# Patient Record
Sex: FEMALE | Race: WHITE | Employment: OTHER | URBAN - NONMETROPOLITAN AREA
[De-identification: names, ages, dates, MRNs, and addresses within clinical notes are randomized per-mention and may not be internally consistent; named-entity substitution may affect disease eponyms.]

---

## 2023-05-17 ENCOUNTER — APPOINTMENT (OUTPATIENT)
Dept: CT IMAGING | Age: 73
End: 2023-05-17
Payer: MEDICARE

## 2023-05-17 ENCOUNTER — HOSPITAL ENCOUNTER (EMERGENCY)
Age: 73
Discharge: HOME | End: 2023-05-17
Attending: EMERGENCY MEDICINE
Payer: MEDICARE

## 2023-05-17 VITALS
SYSTOLIC BLOOD PRESSURE: 155 MMHG | DIASTOLIC BLOOD PRESSURE: 65 MMHG | RESPIRATION RATE: 14 BRPM | HEART RATE: 73 BPM | OXYGEN SATURATION: 96 % | TEMPERATURE: 97.8 F
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
## 2023-05-17 LAB
ALBUMIN SERPL BCG-MCNC: 3.8 G/DL (ref 3.5–5.1)
ALP SERPL-CCNC: 98 U/L (ref 38–126)
ALT SERPL W/O P-5'-P-CCNC: 10 U/L (ref 11–66)
ANION GAP SERPL CALC-SCNC: 12 MEQ/L (ref 8–16)
AST SERPL-CCNC: 15 U/L (ref 5–40)
BACTERIA: NORMAL
BASOPHILS ABSOLUTE: 0.1 THOU/MM3 (ref 0–0.1)
BASOPHILS NFR BLD AUTO: 2 %
BILIRUB SERPL-MCNC: 0.4 MG/DL (ref 0.3–1.2)
BILIRUB UR QL STRIP: NEGATIVE
BUN SERPL-MCNC: 8 MG/DL (ref 7–22)
CALCIUM SERPL-MCNC: 9 MG/DL (ref 8.5–10.5)
CASTS #/AREA URNS LPF: NORMAL /LPF
CASTS #/AREA URNS LPF: NORMAL /LPF
CHARACTER UR: CLEAR
CHARCOAL URNS QL MICRO: NORMAL
CHLORIDE SERPL-SCNC: 105 MEQ/L (ref 98–111)
CO2 SERPL-SCNC: 27 MEQ/L (ref 23–33)
COLOR UR: YELLOW
CREAT SERPL-MCNC: 0.9 MG/DL (ref 0.4–1.2)
CRYSTALS URNS QL MICRO: NORMAL
DEPRECATED RDW RBC AUTO: 49.7 FL (ref 35–45)
EOSINOPHIL NFR BLD AUTO: 7 %
EOSINOPHILS ABSOLUTE: 0.4 THOU/MM3 (ref 0–0.4)
EPITHELIAL CELLS, UA: NORMAL /HPF
ERYTHROCYTE [DISTWIDTH] IN BLOOD BY AUTOMATED COUNT: 13.7 % (ref 11.5–14.5)
GFR SERPL CREATININE-BSD FRML MDRD: > 60 ML/MIN/1.73M2
GLUCOSE SERPL-MCNC: 111 MG/DL (ref 70–108)
GLUCOSE UR QL STRIP.AUTO: NEGATIVE MG/DL
HCT VFR BLD AUTO: 43.9 % (ref 37–47)
HGB BLD-MCNC: 13.7 GM/DL (ref 12–16)
HGB UR QL STRIP.AUTO: NEGATIVE
IMM GRANULOCYTES # BLD AUTO: 0.02 THOU/MM3 (ref 0–0.07)
IMM GRANULOCYTES NFR BLD AUTO: 0.4 %
KETONES UR QL STRIP.AUTO: NEGATIVE
LEUKOCYTE ESTERASE UR QL STRIP.AUTO: NEGATIVE
LYMPHOCYTES ABSOLUTE: 1 THOU/MM3 (ref 1–4.8)
LYMPHOCYTES NFR BLD AUTO: 18.6 %
MAGNESIUM SERPL-MCNC: 2 MG/DL (ref 1.6–2.4)
MCH RBC QN AUTO: 30.8 PG (ref 26–33)
MCHC RBC AUTO-ENTMCNC: 31.2 GM/DL (ref 32.2–35.5)
MCV RBC AUTO: 98.7 FL (ref 81–99)
MONOCYTES ABSOLUTE: 0.6 THOU/MM3 (ref 0.4–1.3)
MONOCYTES NFR BLD AUTO: 10.3 %
NEUTROPHILS NFR BLD AUTO: 61.7 %
NITRITE UR QL STRIP.AUTO: NEGATIVE
NRBC BLD AUTO-RTO: 0 /100 WBC
OSMOLALITY SERPL CALC.SUM OF ELEC: 285.9 MOSMOL/KG (ref 275–300)
PH UR STRIP.AUTO: 7.5 [PH] (ref 5–9)
PLATELET # BLD AUTO: 976 THOU/MM3 (ref 130–400)
PMV BLD AUTO: 10.1 FL (ref 9.4–12.4)
POTASSIUM SERPL-SCNC: 4.1 MEQ/L (ref 3.5–5.2)
PROT SERPL-MCNC: 6.6 G/DL (ref 6.1–8)
PROT UR STRIP.AUTO-MCNC: NEGATIVE MG/DL
RBC # BLD AUTO: 4.45 MILL/MM3 (ref 4.2–5.4)
RBC #/AREA URNS HPF: NORMAL /HPF
RENAL EPI CELLS #/AREA URNS HPF: NORMAL /[HPF]
SEGMENTED NEUTROPHILS ABSOLUTE COUNT: 3.3 THOU/MM3 (ref 1.8–7.7)
SODIUM SERPL-SCNC: 144 MEQ/L (ref 135–145)
SPECIFIC GRAVITY UA: 1.01 (ref 1–1.03)
UROBILINOGEN, URINE: 0.2 EU/DL (ref 0–1)
WBC # BLD AUTO: 5.4 THOU/MM3 (ref 4.8–10.8)
WBC #/AREA URNS HPF: NORMAL /HPF
YEAST LIKE FUNGI URNS QL MICRO: NORMAL

## 2023-05-17 PROCEDURE — 83735 ASSAY OF MAGNESIUM: CPT

## 2023-05-17 PROCEDURE — 72125 CT NECK SPINE W/O DYE: CPT

## 2023-05-17 PROCEDURE — 87086 URINE CULTURE/COLONY COUNT: CPT

## 2023-05-17 PROCEDURE — 73700 CT LOWER EXTREMITY W/O DYE: CPT

## 2023-05-17 PROCEDURE — 36415 COLL VENOUS BLD VENIPUNCTURE: CPT

## 2023-05-17 PROCEDURE — 70486 CT MAXILLOFACIAL W/O DYE: CPT

## 2023-05-17 PROCEDURE — 81001 URINALYSIS AUTO W/SCOPE: CPT

## 2023-05-17 PROCEDURE — 85025 COMPLETE CBC W/AUTO DIFF WBC: CPT

## 2023-05-17 PROCEDURE — 93010 ELECTROCARDIOGRAM REPORT: CPT | Performed by: INTERNAL MEDICINE

## 2023-05-17 PROCEDURE — 99284 EMERGENCY DEPT VISIT MOD MDM: CPT

## 2023-05-17 PROCEDURE — 80053 COMPREHEN METABOLIC PANEL: CPT

## 2023-05-17 PROCEDURE — 93005 ELECTROCARDIOGRAM TRACING: CPT | Performed by: EMERGENCY MEDICINE

## 2023-05-17 PROCEDURE — 70450 CT HEAD/BRAIN W/O DYE: CPT

## 2023-05-17 ASSESSMENT — PAIN - FUNCTIONAL ASSESSMENT: PAIN_FUNCTIONAL_ASSESSMENT: NONE - DENIES PAIN

## 2023-05-17 NOTE — ED PROVIDER NOTES
I performed a history and physical examination of the patient and discussed management with the resident. I reviewed the residents note and agree with the documented findings and plan of care. Any areas of disagreement are noted on the chart. I was personally present for the key portions of any procedures. I have documented in the chart those procedures where I was not present during the key portions. I have reviewed the emergency nurses triage note. I agree with the chief complaint, past medical history, past surgical history, allergies, medications, social and family history as documented unless otherwise noted below. Documentation of the HPI, Physical Exam and Medical Decision Making performed by medical students or scribes is based on my personal performance of the HPI, PE and MDM. For Phys Assistant/ Nurse Practitioner cases/documentation I have personally evaluated this patient and have completed at least one if not all key elements of the E/M (history, physical exam, and MDM). My findings are as noted below. In other words, I personally saw and examined the patient I have reviewed and agreed with the resident findings including all diagnostic interpretations and treatment plans as written. I was present for the key portion of any procedures performed and the inclusive time noted in any critical care statement. Patient presents today via nursing home for hypertension and bradycardia and a fall. Apparently she had a fall on Sunday. She had some hypotension and low heart rate so they decided bring her in. Here today her blood pressure is 157/75 and pulse of 75. Patient has no medical history on file here. She is in a nursing home. She is nonverbal.  Has a significant history of dementia and behavioral disorders. Apparently according to nursing home her heart rate got down to 57. Again her call was on Sunday. Here today she has gotten out of the cot several times and walked around the department.

## 2023-05-17 NOTE — ED NOTES
Patient resting in bed. Respirations easy and unlabored. No distress noted. Call light within reach. Sitter at bedside.      Cat Degroot RN  05/17/23 6283

## 2023-05-17 NOTE — ED TRIAGE NOTES
Presents to ED with c/o fall on Sunday and hypertension today. Patient is at neuro baseline of nonverbal. Patient and obeying commands. Respirations easy and unlabored. Patient has bruising to face.

## 2023-05-17 NOTE — ED NOTES
Patient sitting in wheelchair in room. Sitter at bedside. Will monitor.       Tan Toure RN  05/17/23 9239

## 2023-05-17 NOTE — ED PROVIDER NOTES
[address] [address]          Pt Name: Gio Cerna  MRN: [id number]  [place] 1950  Date of evaluation: 5/17/2023  Treating Resident Physician: Denice Mims DO  Supervising Physician: Dr. Narayan Pringle    History could not be obtained because patient is on afebrile. Gaviota Boswell CHIEF COMPLAINT       Chief Complaint   Patient presents with    Hypertension           HISTORY OF PRESENT ILLNESS    HPI  Gio Cerna is a 75 y.o. female who presents to the emergency department for evaluation of suspected bradycardia in the nursing home. Patient also had a fall where she had injury to the face. Home states that patient fell on Sunday which was 5/14/2023. Could not get history from patient because patient is nonverbal.  Patient is from nursing home. The patient has no other acute complaints at this time. REVIEW OF SYSTEMS   Review of Systems could not be obtained because patient is nonverbal.      PAST MEDICAL AND SURGICAL HISTORY   No past medical history on file. No past surgical history on file. MEDICATIONS   No current facility-administered medications for this encounter. No current outpatient medications on file. SOCIAL HISTORY     Social History     Social History Narrative    Not on file            ALLERGIES     Allergies   Allergen Reactions    Penicillins     Restasis [Cyclosporine]          FAMILY HISTORY   No family history on file. PREVIOUS RECORDS   Previous records reviewed:  yes . PHYSICAL EXAM     ED Triage Vitals   BP Temp Temp Source Pulse Respirations SpO2 Height Weight   05/17/23 1054 05/17/23 1054 05/17/23 1054 05/17/23 1054 05/17/23 1054 05/17/23 1056 -- --   (!) 157/75 97.8 °F (36.6 °C) Oral 75 12 94 %       Initial vital signs and nursing assessment reviewed and  patient's medical record is noted in chart. . There is no height or weight on file to calculate BMI.  Pulsoximetry is  patient's medical record

## 2023-05-18 LAB
BACTERIA UR CULT: ABNORMAL
ORGANISM: ABNORMAL

## 2023-05-19 LAB
EKG ATRIAL RATE: 54 BPM
EKG P AXIS: 4 DEGREES
EKG P-R INTERVAL: 142 MS
EKG Q-T INTERVAL: 442 MS
EKG QRS DURATION: 84 MS
EKG QTC CALCULATION (BAZETT): 419 MS
EKG R AXIS: -50 DEGREES
EKG T AXIS: 62 DEGREES
EKG VENTRICULAR RATE: 54 BPM

## 2023-11-04 ENCOUNTER — APPOINTMENT (OUTPATIENT)
Dept: CT IMAGING | Age: 73
End: 2023-11-04
Payer: MEDICARE

## 2023-11-04 ENCOUNTER — APPOINTMENT (OUTPATIENT)
Dept: GENERAL RADIOLOGY | Age: 73
End: 2023-11-04
Payer: MEDICARE

## 2023-11-04 ENCOUNTER — HOSPITAL ENCOUNTER (EMERGENCY)
Age: 73
Discharge: HOME | End: 2023-11-04
Attending: EMERGENCY MEDICINE
Payer: MEDICARE

## 2023-11-04 VITALS
BODY MASS INDEX: 23.92 KG/M2 | RESPIRATION RATE: 16 BRPM | SYSTOLIC BLOOD PRESSURE: 136 MMHG | TEMPERATURE: 98 F | WEIGHT: 130 LBS | HEIGHT: 62 IN | DIASTOLIC BLOOD PRESSURE: 106 MMHG | HEART RATE: 63 BPM | OXYGEN SATURATION: 96 %
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
## 2023-11-04 DIAGNOSIS — S00.83XA CONTUSION OF JAW, INITIAL ENCOUNTER: Primary | ICD-10-CM

## 2023-11-04 PROBLEM — Z79.02 ANTIPLATELET OR ANTITHROMBOTIC LONG-TERM USE: Status: ACTIVE | Noted: 2023-11-04

## 2023-11-04 PROBLEM — G30.1 SEVERE LATE ONSET ALZHEIMER'S DEMENTIA WITH AGITATION (HCC): Status: ACTIVE | Noted: 2023-11-04

## 2023-11-04 PROBLEM — D75.839 THROMBOCYTOSIS: Status: ACTIVE | Noted: 2023-11-04

## 2023-11-04 PROBLEM — F02.C11 SEVERE LATE ONSET ALZHEIMER'S DEMENTIA WITH AGITATION (HCC): Status: ACTIVE | Noted: 2023-11-04
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
## 2023-11-04 LAB
ALBUMIN SERPL BCG-MCNC: 3.3 G/DL (ref 3.5–5.1)
ALP SERPL-CCNC: 91 U/L (ref 38–126)
ALT SERPL W/O P-5'-P-CCNC: 11 U/L (ref 11–66)
ANION GAP SERPL CALC-SCNC: 6 MEQ/L (ref 8–16)
APTT PPP: 29.2 SECONDS (ref 22–38)
AST SERPL-CCNC: 16 U/L (ref 5–40)
BILIRUB SERPL-MCNC: 0.5 MG/DL (ref 0.3–1.2)
BUN SERPL-MCNC: 10 MG/DL (ref 7–22)
CALCIUM SERPL-MCNC: 8.1 MG/DL (ref 8.5–10.5)
CHLORIDE SERPL-SCNC: 106 MEQ/L (ref 98–111)
CO2 SERPL-SCNC: 27 MEQ/L (ref 23–33)
CREAT SERPL-MCNC: 0.8 MG/DL (ref 0.4–1.2)
GFR SERPL CREATININE-BSD FRML MDRD: > 60 ML/MIN/1.73M2
GLUCOSE SERPL-MCNC: 84 MG/DL (ref 70–108)
INR PPP: 1.03 (ref 0.85–1.13)
OSMOLALITY SERPL CALC.SUM OF ELEC: 275.8 MOSMOL/KG (ref 275–300)
POTASSIUM SERPL-SCNC: 3.7 MEQ/L (ref 3.5–5.2)
PROT SERPL-MCNC: 6.2 G/DL (ref 6.1–8)
REASON FOR REJECTION: NORMAL
REJECTED TEST: NORMAL
SCAN OF BLOOD SMEAR: NORMAL
SODIUM SERPL-SCNC: 139 MEQ/L (ref 135–145)
TROPONIN, HIGH SENSITIVITY: 13 NG/L (ref 0–12)

## 2023-11-04 PROCEDURE — 72125 CT NECK SPINE W/O DYE: CPT

## 2023-11-04 PROCEDURE — 99284 EMERGENCY DEPT VISIT MOD MDM: CPT | Performed by: SURGERY

## 2023-11-04 PROCEDURE — 6820000002 HC L2 INJURY CALL ACTIVATION: Performed by: SURGERY

## 2023-11-04 PROCEDURE — 2580000003 HC RX 258: Performed by: STUDENT IN AN ORGANIZED HEALTH CARE EDUCATION/TRAINING PROGRAM

## 2023-11-04 PROCEDURE — 70450 CT HEAD/BRAIN W/O DYE: CPT

## 2023-11-04 PROCEDURE — 93005 ELECTROCARDIOGRAM TRACING: CPT | Performed by: STUDENT IN AN ORGANIZED HEALTH CARE EDUCATION/TRAINING PROGRAM

## 2023-11-04 PROCEDURE — 70486 CT MAXILLOFACIAL W/O DYE: CPT

## 2023-11-04 PROCEDURE — 84484 ASSAY OF TROPONIN QUANT: CPT

## 2023-11-04 PROCEDURE — 85610 PROTHROMBIN TIME: CPT

## 2023-11-04 PROCEDURE — 99285 EMERGENCY DEPT VISIT HI MDM: CPT

## 2023-11-04 PROCEDURE — 85025 COMPLETE CBC W/AUTO DIFF WBC: CPT

## 2023-11-04 PROCEDURE — 72170 X-RAY EXAM OF PELVIS: CPT

## 2023-11-04 PROCEDURE — 36415 COLL VENOUS BLD VENIPUNCTURE: CPT

## 2023-11-04 PROCEDURE — 71045 X-RAY EXAM CHEST 1 VIEW: CPT

## 2023-11-04 PROCEDURE — 85730 THROMBOPLASTIN TIME PARTIAL: CPT

## 2023-11-04 PROCEDURE — 80053 COMPREHEN METABOLIC PANEL: CPT

## 2023-11-04 RX ORDER — 0.9 % SODIUM CHLORIDE 0.9 %
1000 INTRAVENOUS SOLUTION INTRAVENOUS ONCE
Status: COMPLETED | OUTPATIENT
Start: 2023-11-04 | End: 2023-11-04

## 2023-11-04 RX ADMIN — SODIUM CHLORIDE 1000 ML: 9 INJECTION, SOLUTION INTRAVENOUS at 19:42

## 2023-11-04 ASSESSMENT — PAIN - FUNCTIONAL ASSESSMENT: PAIN_FUNCTIONAL_ASSESSMENT: NONE - DENIES PAIN

## 2023-11-04 NOTE — ED NOTES
Pt presents to the ED through EMS with c/c bruising to chin and abrasion. EMS reports that pt has had frequent falls at the nursing home. Pt had unwitnessed fall today, possibly new abrasion to the chin. Pt on daily regimen of Clopidogrel. Level 2 trauma activated d/t nature of fall and uncertainty of possible new bruising.       Raj Farias RN  11/04/23 4072

## 2023-11-04 NOTE — ED NOTES
C-collar placed on pt at this time. Warm blankets placed on patient.       Jacquelin Mata RN  11/04/23 9221

## 2023-11-05 LAB
AUTO DIFF PNL BLD: ABNORMAL
BASOPHILS ABSOLUTE: 0.1 THOU/MM3 (ref 0–0.1)
BASOPHILS NFR BLD AUTO: 1.9 %
DEPRECATED RDW RBC AUTO: 51.6 FL (ref 35–45)
EKG ATRIAL RATE: 61 BPM
EKG P AXIS: 27 DEGREES
EKG P-R INTERVAL: 194 MS
EKG Q-T INTERVAL: 446 MS
EKG QRS DURATION: 92 MS
EKG QTC CALCULATION (BAZETT): 448 MS
EKG R AXIS: -48 DEGREES
EKG T AXIS: 56 DEGREES
EKG VENTRICULAR RATE: 61 BPM
EOSINOPHIL NFR BLD AUTO: 7.8 %
EOSINOPHILS ABSOLUTE: 0.5 THOU/MM3 (ref 0–0.4)
ERYTHROCYTE [DISTWIDTH] IN BLOOD BY AUTOMATED COUNT: 14.1 % (ref 11.5–14.5)
HCT VFR BLD AUTO: 43.5 % (ref 37–47)
HGB BLD-MCNC: 13.9 GM/DL (ref 12–16)
IMM GRANULOCYTES # BLD AUTO: 0.04 THOU/MM3 (ref 0–0.07)
IMM GRANULOCYTES NFR BLD AUTO: 0.6 %
LYMPHOCYTES ABSOLUTE: 1.1 THOU/MM3 (ref 1–4.8)
LYMPHOCYTES NFR BLD AUTO: 17.3 %
MCH RBC QN AUTO: 31.7 PG (ref 26–33)
MCHC RBC AUTO-ENTMCNC: 32 GM/DL (ref 32.2–35.5)
MCV RBC AUTO: 99.3 FL (ref 81–99)
MONOCYTES ABSOLUTE: 0.9 THOU/MM3 (ref 0.4–1.3)
MONOCYTES NFR BLD AUTO: 13.7 %
NEUTROPHILS NFR BLD AUTO: 58.7 %
NRBC BLD AUTO-RTO: 0 /100 WBC
PATHOLOGIST REVIEW: ABNORMAL
PLATELET # BLD AUTO: 1004 THOU/MM3 (ref 130–400)
PLATELET BLD QL SMEAR: ABNORMAL
PMV BLD AUTO: 10.5 FL (ref 9.4–12.4)
RBC # BLD AUTO: 4.38 MILL/MM3 (ref 4.2–5.4)
SEGMENTED NEUTROPHILS ABSOLUTE COUNT: 3.8 THOU/MM3 (ref 1.8–7.7)
WBC # BLD AUTO: 6.4 THOU/MM3 (ref 4.8–10.8)

## 2023-11-05 PROCEDURE — 93010 ELECTROCARDIOGRAM REPORT: CPT | Performed by: NUCLEAR MEDICINE

## 2023-11-25 ENCOUNTER — HOSPITAL ENCOUNTER (EMERGENCY)
Age: 73
Discharge: NURSING, REHAB OR LONG-TERM CARE | End: 2023-11-26
Attending: EMERGENCY MEDICINE
Payer: MEDICARE

## 2023-11-25 ENCOUNTER — APPOINTMENT (OUTPATIENT)
Dept: CT IMAGING | Age: 73
End: 2023-11-25
Payer: MEDICARE

## 2023-11-25 DIAGNOSIS — D75.839 THROMBOCYTOSIS: ICD-10-CM
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
## 2023-11-25 LAB
ANION GAP SERPL CALC-SCNC: 9 MEQ/L (ref 8–16)
BASOPHILS ABSOLUTE: 0.1 THOU/MM3 (ref 0–0.1)
BASOPHILS NFR BLD AUTO: 1.3 %
BILIRUB UR QL STRIP.AUTO: NEGATIVE
BUN SERPL-MCNC: 12 MG/DL (ref 7–22)
BURR CELLS BLD QL SMEAR: ABNORMAL
CALCIUM SERPL-MCNC: 8.7 MG/DL (ref 8.5–10.5)
CHARACTER UR: CLEAR
CHLORIDE SERPL-SCNC: 104 MEQ/L (ref 98–111)
CO2 SERPL-SCNC: 25 MEQ/L (ref 23–33)
COLOR: YELLOW
CREAT SERPL-MCNC: 0.9 MG/DL (ref 0.4–1.2)
DEPRECATED RDW RBC AUTO: 51.4 FL (ref 35–45)
EOSINOPHIL NFR BLD AUTO: 9.8 %
EOSINOPHILS ABSOLUTE: 0.7 THOU/MM3 (ref 0–0.4)
ERYTHROCYTE [DISTWIDTH] IN BLOOD BY AUTOMATED COUNT: 14 % (ref 11.5–14.5)
GFR SERPL CREATININE-BSD FRML MDRD: > 60 ML/MIN/1.73M2
GLUCOSE SERPL-MCNC: 121 MG/DL (ref 70–108)
GLUCOSE UR QL STRIP.AUTO: NEGATIVE MG/DL
HCT VFR BLD AUTO: 39.6 % (ref 37–47)
HGB BLD-MCNC: 12.4 GM/DL (ref 12–16)
HGB UR QL STRIP.AUTO: NEGATIVE
IMM GRANULOCYTES # BLD AUTO: 0.03 THOU/MM3 (ref 0–0.07)
IMM GRANULOCYTES NFR BLD AUTO: 0.4 %
INR PPP: 1.08 (ref 0.85–1.13)
KETONES UR QL STRIP.AUTO: NEGATIVE
LYMPHOCYTES ABSOLUTE: 1.2 THOU/MM3 (ref 1–4.8)
LYMPHOCYTES NFR BLD AUTO: 17.7 %
MCH RBC QN AUTO: 31.2 PG (ref 26–33)
MCHC RBC AUTO-ENTMCNC: 31.3 GM/DL (ref 32.2–35.5)
MCV RBC AUTO: 99.5 FL (ref 81–99)
MONOCYTES ABSOLUTE: 0.7 THOU/MM3 (ref 0.4–1.3)
MONOCYTES NFR BLD AUTO: 11 %
NEUTROPHILS NFR BLD AUTO: 59.8 %
NITRITE UR QL STRIP: NEGATIVE
NRBC BLD AUTO-RTO: 0 /100 WBC
OSMOLALITY SERPL CALC.SUM OF ELEC: 276.7 MOSMOL/KG (ref 275–300)
PH UR STRIP.AUTO: 7.5 [PH] (ref 5–9)
PLATELET # BLD AUTO: 1095 THOU/MM3 (ref 130–400)
PLATELET BLD QL SMEAR: ABNORMAL
PMV BLD AUTO: 10.3 FL (ref 9.4–12.4)
POTASSIUM SERPL-SCNC: 4.7 MEQ/L (ref 3.5–5.2)
PROT UR STRIP.AUTO-MCNC: NEGATIVE MG/DL
RBC # BLD AUTO: 3.98 MILL/MM3 (ref 4.2–5.4)
SCAN OF BLOOD SMEAR: NORMAL
SEGMENTED NEUTROPHILS ABSOLUTE COUNT: 4 THOU/MM3 (ref 1.8–7.7)
SODIUM SERPL-SCNC: 138 MEQ/L (ref 135–145)
SP GR UR REFRACT.AUTO: > 1.03 (ref 1–1.03)
UROBILINOGEN, URINE: 1 EU/DL (ref 0–1)
WBC # BLD AUTO: 6.7 THOU/MM3 (ref 4.8–10.8)
WBC #/AREA URNS HPF: NEGATIVE /[HPF]

## 2023-11-25 PROCEDURE — 99285 EMERGENCY DEPT VISIT HI MDM: CPT

## 2023-11-25 PROCEDURE — 36415 COLL VENOUS BLD VENIPUNCTURE: CPT

## 2023-11-25 PROCEDURE — 76376 3D RENDER W/INTRP POSTPROCES: CPT

## 2023-11-25 PROCEDURE — 74177 CT ABD & PELVIS W/CONTRAST: CPT

## 2023-11-25 PROCEDURE — 72125 CT NECK SPINE W/O DYE: CPT

## 2023-11-25 PROCEDURE — 85610 PROTHROMBIN TIME: CPT

## 2023-11-25 PROCEDURE — 80048 BASIC METABOLIC PNL TOTAL CA: CPT

## 2023-11-25 PROCEDURE — 85025 COMPLETE CBC W/AUTO DIFF WBC: CPT

## 2023-11-25 PROCEDURE — 70450 CT HEAD/BRAIN W/O DYE: CPT

## 2023-11-25 PROCEDURE — 81003 URINALYSIS AUTO W/O SCOPE: CPT

## 2023-11-25 PROCEDURE — 6820000002 HC L2 INJURY CALL ACTIVATION: Performed by: SURGERY

## 2023-11-25 PROCEDURE — 93005 ELECTROCARDIOGRAM TRACING: CPT | Performed by: EMERGENCY MEDICINE

## 2023-11-25 PROCEDURE — 71260 CT THORAX DX C+: CPT

## 2023-11-25 PROCEDURE — 6360000004 HC RX CONTRAST MEDICATION: Performed by: EMERGENCY MEDICINE

## 2023-11-25 RX ORDER — TRAMADOL HYDROCHLORIDE 50 MG/1
50 TABLET ORAL EVERY 6 HOURS PRN
COMMUNITY

## 2023-11-25 RX ORDER — HYDRALAZINE HYDROCHLORIDE 25 MG/1
25 TABLET, FILM COATED ORAL 3 TIMES DAILY
COMMUNITY

## 2023-11-25 RX ORDER — PANTOPRAZOLE SODIUM 20 MG/1
20 TABLET, DELAYED RELEASE ORAL DAILY
COMMUNITY

## 2023-11-25 RX ORDER — CLOPIDOGREL BISULFATE 75 MG/1
75 TABLET ORAL DAILY
COMMUNITY

## 2023-11-25 RX ORDER — THIAMINE HYDROCHLORIDE 100 MG/ML
100 INJECTION, SOLUTION INTRAMUSCULAR; INTRAVENOUS DAILY
COMMUNITY

## 2023-11-25 RX ORDER — TRAZODONE HYDROCHLORIDE 50 MG/1
50 TABLET ORAL NIGHTLY
COMMUNITY

## 2023-11-25 RX ORDER — ASPIRIN 81 MG/1
81 TABLET, CHEWABLE ORAL DAILY
COMMUNITY

## 2023-11-25 RX ORDER — MEMANTINE HYDROCHLORIDE 10 MG/1
10 TABLET ORAL NIGHTLY
COMMUNITY

## 2023-11-25 RX ORDER — LISINOPRIL 20 MG/1
20 TABLET ORAL DAILY
COMMUNITY

## 2023-11-25 RX ORDER — MIRTAZAPINE 15 MG/1
7.5 TABLET, FILM COATED ORAL NIGHTLY
COMMUNITY

## 2023-11-25 RX ORDER — THIAMINE MONONITRATE (VIT B1) 100 MG
100 TABLET ORAL DAILY
COMMUNITY

## 2023-11-25 RX ORDER — DONEPEZIL HYDROCHLORIDE 10 MG/1
10 TABLET, FILM COATED ORAL NIGHTLY
COMMUNITY

## 2023-11-25 RX ORDER — 0.9 % SODIUM CHLORIDE 0.9 %
500 INTRAVENOUS SOLUTION INTRAVENOUS ONCE
Status: DISCONTINUED | OUTPATIENT
Start: 2023-11-25 | End: 2023-11-26 | Stop reason: HOSPADM

## 2023-11-25 RX ORDER — FLUOXETINE 10 MG/1
10 CAPSULE ORAL
COMMUNITY

## 2023-11-25 RX ORDER — FERROUS SULFATE 325(65) MG
325 TABLET ORAL
COMMUNITY

## 2023-11-25 RX ORDER — BUSPIRONE HYDROCHLORIDE 10 MG/1
10 TABLET ORAL 3 TIMES DAILY
COMMUNITY

## 2023-11-25 RX ORDER — HALOPERIDOL 2 MG/1
2 TABLET ORAL 2 TIMES DAILY
COMMUNITY

## 2023-11-25 RX ORDER — LANOLIN ALCOHOL/MO/W.PET/CERES
3 CREAM (GRAM) TOPICAL DAILY
COMMUNITY

## 2023-11-25 RX ORDER — DOCUSATE SODIUM 100 MG/1
100 CAPSULE, LIQUID FILLED ORAL NIGHTLY
COMMUNITY

## 2023-11-25 RX ORDER — BUPROPION HYDROCHLORIDE 100 MG/1
100 TABLET ORAL NIGHTLY
COMMUNITY

## 2023-11-25 RX ORDER — ATORVASTATIN CALCIUM 40 MG/1
40 TABLET, FILM COATED ORAL DAILY
COMMUNITY

## 2023-11-25 RX ADMIN — IOPAMIDOL 80 ML: 755 INJECTION, SOLUTION INTRAVENOUS at 17:30

## 2023-11-25 ASSESSMENT — ENCOUNTER SYMPTOMS
SHORTNESS OF BREATH: 0
EYE PAIN: 0
COUGH: 0
TROUBLE SWALLOWING: 0
DIARRHEA: 0
FACIAL SWELLING: 0
WHEEZING: 0
PHOTOPHOBIA: 0
ABDOMINAL DISTENTION: 0
EYE DISCHARGE: 0
SORE THROAT: 0
CHEST TIGHTNESS: 0
CHOKING: 0
BACK PAIN: 0
APNEA: 0
VOMITING: 0
ABDOMINAL PAIN: 0
CONSTIPATION: 0
VOICE CHANGE: 0
NAUSEA: 0
EYE ITCHING: 0
EYE REDNESS: 0
COLOR CHANGE: 0
BLOOD IN STOOL: 0
RHINORRHEA: 0
SINUS PRESSURE: 0
STRIDOR: 0

## 2023-11-25 NOTE — ED NOTES
Dr. Wendy Webber, Dr. Jeannie Castorena and Radha trauma NP at bedside. Bilateral breath sounds noted.  2+ DP and radial pulses per Dr. Kim Juarez, Zoila Calzada RN  11/25/23 2604

## 2023-11-25 NOTE — ED NOTES
Pt and vs reassessed. RR easy and unlabored. Pt attempting to sit up in bed. Pt redirected and posey alarm remains in place.  Pt stable at this time     Jonel Lovelace RN  11/25/23 1480

## 2023-11-25 NOTE — ED NOTES
Pt back from CT in stable condition. VS assessed. RR easy and unlabored. Brief changed and external catheter placed on pt. Posey alarm on.  Pt stable at this time     Tyree Vieyra RN  11/25/23 1851

## 2023-11-25 NOTE — ED TRIAGE NOTES
Pt presents to the ED via EMS from [facility] with c/o fall and head injury. EMS reports pt was walking with assistance when she fell forwards and hit her head on a wheelchair. Pt has abrasion above her right eye. Pt is on blood thinners per nursing home. Pt cries out in pain but does not speak- EMS states pt is at baseline per nursing home. Pt arrives with c-collar in place and IV inserted by EMS. Pt is covid positive since Monday. POCT glucose 121 pta in EMS.

## 2023-11-25 NOTE — ED NOTES
Warm blankets applied.  Per Dr. Navarro Lowers- no obvious bony deformities to any extremities, pelvis is stable, bruising noted to the right breast     Sugey Landrum RN  11/25/23 1635

## 2023-11-25 NOTE — ED NOTES
Pt rolled at this time. Old bruise noted on her right thigh/hip.  No tenderness, step offs or deformities per Dr. Viola Olguin, Karen Ren, KELTON  11/25/23 5737

## 2023-11-26 VITALS
OXYGEN SATURATION: 97 % | RESPIRATION RATE: 17 BRPM | WEIGHT: 130 LBS | TEMPERATURE: 97.4 F | DIASTOLIC BLOOD PRESSURE: 68 MMHG | BODY MASS INDEX: 23.92 KG/M2 | HEART RATE: 68 BPM | SYSTOLIC BLOOD PRESSURE: 142 MMHG | HEIGHT: 62 IN

## 2023-11-26 PROCEDURE — 93010 ELECTROCARDIOGRAM REPORT: CPT | Performed by: NUCLEAR MEDICINE

## 2023-11-26 NOTE — CONSULTS
errors which are inherent in voice recognition technology. **      Final report electronically signed by Dr Mohamud Elliott on 11/25/2023 6:33 PM      CT LUMBAR RECONSTRUCTION WO POST PROCESS   Final Result   1. Age indeterminate mild compression fracture of L2. Marked age indeterminate compression fracture of L3 with greater than 60% vertebrae body height loss. 2. A 3.2 x 2.8 cm infrarenal abdominal aortic aneurysm. 3. A 14.6 x 6.7 x 3.4 cm fluid collection is seen along the right upper thigh region that can relate to a [employer] lesion. 4. Other findings as described above. **This report has been created using voice recognition software. It may contain minor errors which are inherent in voice recognition technology. **      Final report electronically signed by Dr Mohamud Ellitot on 11/25/2023 6:44 PM      CT THORACIC RECONSTRUCTION WO POST PROCESS   Final Result   1. No posttraumatic changes are seen in the chest.   2. No acute fracture of the thoracic spine. **This report has been created using voice recognition software. It may contain minor errors which are inherent in voice recognition technology. **      Final report electronically signed by Dr Mohamud Elliott on 11/25/2023 6:33 PM        Fast Exam: No      25 Minutes spent in patient care collectively between subjective/objective examination, chart review, documentation, clinical reasoning and discussion with attending regarding plan/interval changes.     Electronically signed by ÁLVARO Garcia CNP on 11/25/2023 at 7:18 PM

## 2023-11-26 NOTE — ED NOTES
Pt and vs reassessed. RR easy and unlabored. Pt resting in bed with eyes closed and appears to be sleeping. Posey alarm on.  Pt stable at this time     Donis Goldman RN  11/25/23 8801

## 2023-11-26 NOTE — ED NOTES
Pt and vs reassessed. RR easy and unlabored. Pt resting in bed with eyes closed and appears to be sleeping. Posey alarm on.  Pt stable at this time     Jahaira Alvares RN  11/25/23 2534

## 2023-11-26 NOTE — ED NOTES
Patient gown changed and faheem care performed. New chux placed under patient. Purewick repositioned. Patient boosted up in bed and chair alarm placed on gown. Side rails up x2; call light in reach.       Waldo Zamora  11/25/23 1953

## 2023-11-26 NOTE — ED NOTES
This RN spoke with staff at [address] [place] [address] and updated on pts status and transportation.       Dianna Ramirez RN  11/25/23 3933

## 2023-11-26 NOTE — ED NOTES
Pt and vs reassessed. RR easy and unlabored. Pt resting in bed with eyes closed and appears to be sleeping. Posey alarm in place.  Pt stable at this time     Ramona Leach RN  11/25/23 0880

## 2023-11-26 NOTE — ED NOTES
Pt and vs reassessed. RR easy and unlabored. Pt resting in bed alert. Posey alarm on.  Pt stable at this time     Khloe Escobar RN  11/25/23 1958

## 2023-11-26 NOTE — ED NOTES
Pt and vs reassessed. RR easy and unlabored. Pt resting in bed alert. Posey alarm on.  Pt stable at this time     Alexys Herrera RN  11/25/23 2025

## 2023-11-26 NOTE — ED NOTES
Pt and vs reassessed. RR easy and unlabored. Pt resting in bed alert. Posey alarm in place.  Pt stable at this time     Savita Munroe RN  11/25/23 2111

## 2023-11-29 LAB
EKG ATRIAL RATE: 67 BPM
EKG P AXIS: 40 DEGREES
EKG P-R INTERVAL: 194 MS
EKG Q-T INTERVAL: 458 MS
EKG QRS DURATION: 98 MS
EKG QTC CALCULATION (BAZETT): 483 MS
EKG R AXIS: -50 DEGREES
EKG T AXIS: 68 DEGREES
EKG VENTRICULAR RATE: 67 BPM